# Patient Record
Sex: FEMALE | Race: WHITE | NOT HISPANIC OR LATINO | Employment: STUDENT | URBAN - METROPOLITAN AREA
[De-identification: names, ages, dates, MRNs, and addresses within clinical notes are randomized per-mention and may not be internally consistent; named-entity substitution may affect disease eponyms.]

---
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
## 2020-08-20 ENCOUNTER — TELEPHONE (OUTPATIENT)
Dept: FAMILY MEDICINE CLINIC | Facility: CLINIC | Age: 20
End: 2020-08-20

## 2020-08-20 NOTE — TELEPHONE ENCOUNTER
Patient aware that we do not have any of her immunizations she will need to contact her pediatrician

## 2020-08-20 NOTE — TELEPHONE ENCOUNTER
PATIENTS MOTHER IS CALLING IN FOR PATIENT THE PATIENT WOULD LIKE A COPY OF HER IMMUNIZATION RECORDS.      IF POSSIBLE CAN YOU EMAIL THEM TO THE FOLLOWING EMAIL ADDRESS.    [email or web address]      CALLBACK NUMBER IS:  [phone]